# Patient Record
Sex: FEMALE | Race: WHITE
[De-identification: names, ages, dates, MRNs, and addresses within clinical notes are randomized per-mention and may not be internally consistent; named-entity substitution may affect disease eponyms.]

---

## 2020-06-06 ENCOUNTER — HOSPITAL ENCOUNTER (EMERGENCY)
Dept: HOSPITAL 38 - CC.ED | Age: 25
Discharge: HOME | End: 2020-06-06
Payer: MEDICARE

## 2020-06-06 DIAGNOSIS — Z88.0: ICD-10-CM

## 2020-06-06 DIAGNOSIS — Z88.8: ICD-10-CM

## 2020-06-06 DIAGNOSIS — Z79.899: ICD-10-CM

## 2020-06-06 DIAGNOSIS — F32.9: ICD-10-CM

## 2020-06-06 DIAGNOSIS — F41.9: ICD-10-CM

## 2020-06-06 DIAGNOSIS — F90.9: ICD-10-CM

## 2020-06-06 DIAGNOSIS — N39.0: Primary | ICD-10-CM

## 2020-06-06 RX ADMIN — NITROFURANTOIN MONOHYDRATE AND NITROFURANTOIN MACROCRYSTALLINE ONE PACKET: 75; 25 CAPSULE ORAL at 16:41

## 2020-06-06 RX ADMIN — Medication ONE PACKET: at 16:46

## 2020-06-06 NOTE — EDM.PDOC
ED HPI GENERAL MEDICAL PROBLEM





- General


Chief Complaint: General


Stated Complaint: Burning with urination


Time Seen by Provider: 06/06/20 16:28


Source of Information: Reports: Patient


History Limitations: Reports: No Limitations





- History of Present Illness


INITIAL COMMENTS - FREE TEXT/NARRATIVE: 





This patient is a 24 year old female. Patient reports for 3 days having burning 

with urination. Denies all other complaints. 


Onset Date: 06/03/20


Duration: Day(s): (3)


Quality: Reports: Burning


Severity: Mild


Improves with: Reports: None


Worsens with: Reports: None


Associated Symptoms: Reports: No Other Symptoms.  Denies: Confusion, Chest Pain

, Cough, cough w sputum, Diaphoresis, Fever/Chills, Headaches, Loss of Appetite

, Malaise, Nausea/Vomiting, Rash, Seizure, Shortness of Breath, Syncope, 

Weakness





- Related Data


 Allergies











Allergy/AdvReac Type Severity Reaction Status Date / Time


 


diphenhydramine Allergy  Hyperactivi Verified 06/06/20 16:07





[From Benadryl]   ty  


 


Penicillins Allergy  Hives Verified 06/06/20 16:07


 


trazodone Allergy  Hives Verified 06/06/20 16:07











Home Meds: 


 Home Meds





Methylphenidate HCl [Concerta] 18 mg PO ACBREAKFAST 06/06/20 [History]


Nitrofurantoin Monohyd/M-Cryst [Macrobid 100 mg Capsule] 100 mg PO BID #12 

capsule 06/06/20 [Rx]


QUEtiapine [SEROquel] 50 mg PO ACBREAKFAST 06/06/20 [History]


QUEtiapine [SEROquel] 150 mg PO BEDTIME 06/06/20 [History]


cloNIDine [Catapres] 0.1 mg PO ACBREAKFAST 06/06/20 [History]


cloNIDine [Catapres] 0.3 mg PO BEDTIME 06/06/20 [History]











Past Medical History


Psychiatric History: Reports: ADHD, Anxiety, Autism, Depression, OCD





- Past Surgical History


HEENT Surgical History: Reports: Adenoidectomy


GI Surgical History: Reports: Cholecystectomy





Social & Family History





- Tobacco Use


Smoking Status *Q: Never Smoker





ED ROS GENERAL





- Review of Systems


Review Of Systems: See Below


Constitutional: Reports: No Symptoms.  Denies: Fever


HEENT: Reports: No Symptoms


Respiratory: Reports: No Symptoms


Cardiovascular: Reports: No Symptoms


Endocrine: Reports: No Symptoms


GI/Abdominal: Reports: No Symptoms.  Denies: Abdominal Pain, Diarrhea, Nausea, 

Vomiting


: Reports: Dysuria.  Denies: Discharge, Flank Pain, Frequency, Hematuria, 

Incontinence, Urgency, Urinary Retention


Musculoskeletal: Reports: No Symptoms


Skin: Reports: No Symptoms


Neurological: Reports: No Symptoms


Psychiatric: Reports: No Symptoms


Hematologic/Lymphatic: Reports: No Symptoms


Immunologic: Reports: No Symptoms





ED EXAM, GENERAL





- Physical Exam


Exam: See Below


Exam Limited By: No Limitations


General Appearance: Alert, WD/WN, No Apparent Distress, Other (scrollin on 

phone during entire examination)


Eye Exam: Bilateral Eye: Normal Inspection, PERRL


Ears: Normal External Exam, Normal Canal, Hearing Grossly Normal, Normal TMs, 

Other (Right ear excluded during examination due to patient has a bluetooth 

earplug to phone in this ear. )


Ear Exam: Left Ear: Auricle Normal, Canal Normal, TM normal


Nose: Normal Inspection, Normal Mucosa, No Blood


Throat/Mouth: Normal Inspection, Normal Lips, Normal Teeth, Normal Gums, Normal 

Oropharynx, Normal Voice, No Airway Compromise


Head: Atraumatic, Normocephalic


Neck: Normal Inspection, Supple, Non-Tender, Full Range of Motion


Respiratory/Chest: No Respiratory Distress, Lungs Clear, Normal Breath Sounds, 

No Accessory Muscle Use


Cardiovascular: Normal Peripheral Pulses, Regular Rate, Rhythm, No Edema, No 

Gallop, No JVD, No Murmur, No Rub


Peripheral Pulses: 2+: Radial (L), Radial (R)


GI/Abdominal: Normal Bowel Sounds, Soft, Non-Tender, No Organomegaly, No 

Distention, No Abnormal Bruit, No Mass, Pelvis Stable


 (Female) Exam: Deferred


Rectal (Female) Exam: Deferred


Back Exam: Normal Inspection, Full Range of Motion.  No: CVA Tenderness (L), 

CVA Tenderness (R), Decreased Range of Motion, Muscle Spasm, Paraspinal 

Tenderness, Vertebral Tenderness


Extremities: Normal Inspection


Neurological: Alert, Oriented


Psychiatric: Normal Affect, Normal Mood


Skin Exam: Warm, Dry, Intact, Normal Color, No Rash





Course





- Vital Signs


Last Recorded V/S: 


 Last Vital Signs











Temp  96.5 F L  06/06/20 16:02


 


Pulse  91   06/06/20 16:02


 


Resp  18   06/06/20 16:02


 


BP  106/68   06/06/20 16:02


 


Pulse Ox  98   06/06/20 16:02














- Orders/Labs/Meds


Orders: 


 Active Orders 24 hr











 Category Date Time Status


 


 CULTURE URINE [RM] Routine Lab  06/06/20 16:33 Received


 


 Phenazopyridine [Take Home: Phenazopyridine, 4 Tab Pack Med  06/06/20 16:40 

Once





 ]   





 2 packet .XX ONETIME ONE   











Labs: 


 Laboratory Tests











  06/06/20 06/06/20 Range/Units





  16:18 16:18 


 


Urine Color  Yellow   (YELLOW)  


 


Urine Appearance  Slightly cloudy   (CLEAR)  


 


Urine pH  5.5   (4.5-8.0)  


 


Ur Specific Gravity  1.020   (1.003-1.020)  


 


Urine Protein  Negative   (NEGATIVE)  mg/dL


 


Urine Glucose (UA)  Negative   (NEGATIVE)  mg/dL


 


Urine Ketones  Negative   (NEGATIVE)  mg/dL


 


Urine Occult Blood  Small H   (NEGATIVE)  


 


Urine Nitrite  Negative   (NEGATIVE)  


 


Urine Bilirubin  Negative   (NEGATIVE)  


 


Urine Urobilinogen  0.2   (0.2-1.0)  EU/dL


 


Ur Leukocyte Esterase  Trace H   (NEGATIVE)  


 


Urine RBC  5-10 H   (0-5)  /HPF


 


Urine WBC  5-10 H   (0-5)  /HPF


 


Urine Bacteria  Few H   (NOT SEEN)  /HPF


 


Urinalysis Comment     


 


Urine HCG, Qual   Negative  











Meds: 


Medications














Discontinued Medications














Generic Name Dose Route Start Last Admin





  Trade Name Gibranq  PRN Reason Stop Dose Admin


 


Nitrofurantoin Macrocrystals  2 packet  06/06/20 16:37  06/06/20 16:41





  Take Home: Nitrofur Mono/Ma 100 Mg, 2 Pack  PO  06/06/20 16:38  2 packet





  ONETIME ONE   Administration





     





     





     





     














Departure





- Departure


Time of Disposition: 16:38


Disposition: Home, Self-Care 01


Condition: Good


Clinical Impression: 


 UTI, Urinary tract infectious disease








- Discharge Information


*PRESCRIPTION DRUG MONITORING PROGRAM REVIEWED*: Not Applicable


*COPY OF PRESCRIPTION DRUG MONITORING REPORT IN PATIENT ELI: Not Applicable


Prescriptions: 


Nitrofurantoin Monohyd/M-Cryst [Macrobid 100 mg Capsule] 100 mg PO BID #12 

capsule


Instructions:  Urinary Tract Infection, Adult, Easy-to-Read


Referrals: 


PCP,None [Primary Care Provider] - 


Forms:  ED Department Discharge


Additional Instructions: 


Followup with your primary care provider as needed or in 3 days for culture 

report for urine


Return to the ER for worsening of condition or any emergent concerns such as 

fever or vomiting


Increase fluids


Macrobid 100mg 1 pill twice a day for 7 days #12 no refill: [facility]: #4 take home


Pyridium 100mg i pill three times a day for 2 days for burning #8 take home: 

Only take 6 though. 








Sepsis Event Note





- Evaluation


Sepsis Screening Result: No Definite Risk





- Focused Exam


Vital Signs: 


 Vital Signs











  Temp Pulse Resp BP Pulse Ox


 


 06/06/20 16:02  96.5 F L  91  18  106/68  98











Date Exam was Performed: 06/06/20


Time Exam was Performed: 16:41





- My Orders


Last 24 Hours: 


My Active Orders





06/06/20 16:33


CULTURE URINE [RM] Routine 





06/06/20 16:40


Phenazopyridine [Take Home: Phenazopyridine, 4 Tab Pack]   2 packet .XX ONETIME 

ONE 














- Assessment/Plan


Last 24 Hours: 


My Active Orders





06/06/20 16:33


CULTURE URINE [RM] Routine 





06/06/20 16:40


Phenazopyridine [Take Home: Phenazopyridine, 4 Tab Pack]   2 packet .XX ONETIME 

ONE 











Plan: 





PLEASE SEE RN NOTE FOR PFSH

## 2021-04-11 ENCOUNTER — HOSPITAL ENCOUNTER (EMERGENCY)
Dept: HOSPITAL 38 - CC.ED | Age: 26
Discharge: HOME | End: 2021-04-11
Payer: MEDICARE

## 2021-04-11 VITALS — HEART RATE: 103 BPM | SYSTOLIC BLOOD PRESSURE: 125 MMHG | DIASTOLIC BLOOD PRESSURE: 83 MMHG

## 2021-04-11 DIAGNOSIS — Z88.8: ICD-10-CM

## 2021-04-11 DIAGNOSIS — R30.0: Primary | ICD-10-CM

## 2021-04-11 DIAGNOSIS — Z88.0: ICD-10-CM

## 2021-04-11 NOTE — EDM.PDOC
ED HPI GENERAL MEDICAL PROBLEM





- General


Chief Complaint: General


Stated Complaint: bladder infection


Time Seen by Provider: 04/11/21 08:44


Source of Information: Reports: Patient


History Limitations: Reports: No Limitations





- History of Present Illness


INITIAL COMMENTS - FREE TEXT/NARRATIVE: 





This patient reports for 2 days having burning with urination. She denies back 

pain, fever, vomiting, nausea, abd pain, pelvic pain, vaginal discharge, vaginal

itching, foul odor. She reports her only symptom is burning with urination.


Onset Date: 04/09/21


Duration: Day(s): (2)


Quality: Reports: Burning


Severity: Mild


Improves with: Reports: Other (not urinating)


Worsens with: Reports: Other (urination)


Associated Symptoms: Reports: No Other Symptoms.  Denies: Confusion, 

Fever/Chills, Headaches, Loss of Appetite, Nausea/Vomiting, Shortness of Breath





- Related Data


                                    Allergies











Allergy/AdvReac Type Severity Reaction Status Date / Time


 


diphenhydramine Allergy  Hyperactivi Verified 04/11/21 08:51





[From Benadryl]   ty  


 


Penicillins Allergy  Hives Verified 04/11/21 08:51


 


trazodone Allergy  Hives Verified 04/11/21 08:51











Home Meds: 


                                    Home Meds





Methylphenidate HCl [Concerta] 18 mg PO ACBREAKFAST 06/06/20 [History]


Nitrofurantoin Monohyd/M-Cryst [Macrobid 100 mg Capsule] 100 mg PO BID #12 

capsule 06/06/20 [Rx]


QUEtiapine [SEROquel] 50 mg PO ACBREAKFAST 06/06/20 [History]


QUEtiapine [SEROquel] 150 mg PO BEDTIME 06/06/20 [History]


cloNIDine [Catapres] 0.1 mg PO ACBREAKFAST 06/06/20 [History]


cloNIDine [Catapres] 0.3 mg PO BEDTIME 06/06/20 [History]











Past Medical History


HEENT History: Reports: Impaired Vision


Psychiatric History: Reports: ADHD, Anxiety, Autism, Depression, OCD





- Past Surgical History


HEENT Surgical History: Reports: Adenoidectomy


GI Surgical History: Reports: Cholecystectomy





Social & Family History





- Tobacco Use


Tobacco Use Status *Q: Never Tobacco User


Second Hand Smoke Exposure: No





- Caffeine Use


Caffeine Use: Reports: Energy Drinks, Soda





- Recreational Drug Use


Recreational Drug Use: No





ED ROS GENERAL





- Review of Systems


Review Of Systems: See Below


Constitutional: Reports: No Symptoms


HEENT: Reports: No Symptoms


Respiratory: Reports: No Symptoms


Cardiovascular: Reports: No Symptoms


Endocrine: Reports: No Symptoms


GI/Abdominal: Reports: No Symptoms.  Denies: Abdominal Pain, Diarrhea, Nausea, 

Vomiting


: Reports: Dysuria.  Denies: Discharge, Flank Pain, Frequency, Hematuria, 

Incontinence, Pain, Urgency, Urinary Retention


Musculoskeletal: Reports: No Symptoms


Skin: Reports: No Symptoms


Neurological: Reports: No Symptoms


Psychiatric: Reports: No Symptoms


Hematologic/Lymphatic: Reports: No Symptoms


Immunologic: Reports: No Symptoms





ED EXAM, GENERAL





- Physical Exam


Exam: See Below


Exam Limited By: No Limitations


General Appearance: Alert, WD/WN, No Apparent Distress


Eye Exam: Bilateral Eye: Abnormal EOM, Abnormal Pupil, A-V [place]


Ears: Normal External Exam, Normal Canal, Hearing Grossly Normal, Normal TMs


Nose: Normal Inspection, Normal Mucosa, No Blood


Throat/Mouth: Normal Inspection, Normal Lips, Normal Teeth, Normal Gums, Normal 

Oropharynx, Normal Voice, No Airway Compromise


Head: Atraumatic, Normocephalic


Neck: Normal Inspection, Supple, Non-Tender, Full Range of Motion


Respiratory/Chest: No Respiratory Distress, Lungs Clear, Normal Breath Sounds, 

No Accessory Muscle Use


Cardiovascular: Normal Peripheral Pulses, Regular Rate, Rhythm, No Edema, No 

Gallop, No JVD, No Murmur, No Rub


Peripheral Pulses: 2+: Radial (L), Radial (R)


GI/Abdominal: Normal Bowel Sounds, Soft, Non-Tender, No Organomegaly, No 

Distention, No Mass, Pelvis Stable


 (Female) Exam: Deferred


Rectal (Female) Exam: Deferred


Back Exam: Normal Inspection, Full Range of Motion.  No: CVA Tenderness (L), CVA

 Tenderness (R)


Extremities: Normal Inspection, Normal Range of Motion, Non-Tender, No Pedal 

Edema, Normal Capillary Refill


Neurological: Alert, Oriented, Normal Cognition, Normal Gait, No Motor/Sensory 

Deficits


Psychiatric: Normal Affect, Normal Mood


Skin Exam: Warm, Dry, Intact, Normal Color, No Rash


Lymphatic: No Adenopathy





Course





- Vital Signs


Last Recorded V/S: 


                                Last Vital Signs











Temp  98.3 F   04/11/21 08:16


 


Pulse  103 H  04/11/21 08:16


 


Resp  20   04/11/21 08:16


 


BP  125/83   04/11/21 08:16


 


Pulse Ox  97   04/11/21 08:16














- Orders/Labs/Meds


Orders: 


                               Active Orders 24 hr











 Category Date Time Status


 


 CHLAMYDIA AND GONORRHEA BY [facility] Stat Lab  04/11/21 08:30 Received











Labs: 


                                Laboratory Tests











  04/11/21 04/11/21 Range/Units





  08:40 08:41 


 


Urine Color   Light yellow  (YELLOW)  


 


Urine Appearance   Clear  (CLEAR)  


 


Urine pH   5.5  (4.5-8.0)  


 


Ur Specific Gravity   <= 1.005  (1.003-1.020)  


 


Urine Protein   Negative  (NEGATIVE)  mg/dL


 


Urine Glucose (UA)   Negative  (NEGATIVE)  mg/dL


 


Urine Ketones   Negative  (NEGATIVE)  mg/dL


 


Urine Occult Blood   Negative  (NEGATIVE)  


 


Urine Nitrite   Negative  (NEGATIVE)  


 


Urine Bilirubin   Negative  (NEGATIVE)  


 


Urine Urobilinogen   0.2  (0.2-1.0)  EU/dL


 


Ur Leukocyte Esterase   Negative  (NEGATIVE)  


 


Urine HCG, Qual  Negative   














Departure





- Departure


Time of Disposition: 09:00


Disposition: Home, Self-Care 01


Condition: Good


Clinical Impression: 


 Dysuria








- Discharge Information


*PRESCRIPTION DRUG MONITORING PROGRAM REVIEWED*: Not Applicable


*COPY OF PRESCRIPTION DRUG MONITORING REPORT IN PATIENT ELI: Not Applicable


Instructions:  Dysuria


Forms:  ED Department Discharge


Additional Instructions: 


Followup with clinic with primary care provider for recheck this week of your 

test results and further evaluation and an annual exam


Return to the ER for worsening of condition or any emergent concerns such as 

fever, vomiting, pain


Further urine testing was sent out to an off site lab as we discussed and will 

be resulted this week: Followup with clinic for results


Increase fluids


Over the counter Cranberry juice as needed for pain





Sepsis Event Note (ED)





- Evaluation


Sepsis Screening Result: No Definite Risk





- Focused Exam


Vital Signs: 


                                   Vital Signs











  Temp Pulse Resp BP Pulse Ox


 


 04/11/21 08:16  98.3 F  103 H  20  125/83  97














- My Orders


Last 24 Hours: 


My Active Orders





04/11/21 08:30


CHLAMYDIA AND GONORRHEA BY [facility] Stat 














- Assessment/Plan


Last 24 Hours: 


My Active Orders





04/11/21 08:30


CHLAMYDIA AND GONORRHEA BY [facility] Stat 











Plan: 





PLEASE SEE RN NOTE FOR PFSH